# Patient Record
Sex: FEMALE | Race: WHITE | URBAN - METROPOLITAN AREA
[De-identification: names, ages, dates, MRNs, and addresses within clinical notes are randomized per-mention and may not be internally consistent; named-entity substitution may affect disease eponyms.]

---

## 2017-07-11 PROBLEM — 134407002 CHRONIC BACK PAIN: Status: ACTIVE | Noted: 2017-07-11

## 2021-03-16 ENCOUNTER — OFFICE VISIT (OUTPATIENT)
Dept: URBAN - METROPOLITAN AREA CLINIC 44 | Facility: CLINIC | Age: 51
End: 2021-03-16

## 2021-08-28 ENCOUNTER — TELEPHONE ENCOUNTER (OUTPATIENT)
Dept: URBAN - METROPOLITAN AREA CLINIC 13 | Facility: CLINIC | Age: 51
End: 2021-08-28

## 2021-08-28 RX ORDER — HYDROCORTISONE CREAM 1% 10 MG/G
CREAM TOPICAL
OUTPATIENT
Start: 2017-11-28 | End: 2021-03-16

## 2021-08-28 RX ORDER — LUBIPROSTONE 8 UG/1
CAPSULE, GELATIN COATED ORAL
OUTPATIENT
End: 2017-08-28

## 2021-08-28 RX ORDER — LINACLOTIDE 145 UG/1
CAPSULE, GELATIN COATED ORAL
OUTPATIENT
Start: 2017-11-09 | End: 2017-11-28

## 2021-08-28 RX ORDER — FLUCONAZOLE 100 MG/1
TABLET ORAL
OUTPATIENT
Start: 2017-07-11 | End: 2017-08-28

## 2021-08-28 RX ORDER — NITAZOXANIDE 500 MG/1
TABLET ORAL
OUTPATIENT
Start: 2017-08-28 | End: 2017-11-28

## 2021-08-28 RX ORDER — BIFIDOBACTERIUM LONGUM 10MM CELL
CAPSULE ORAL
OUTPATIENT
Start: 2017-08-28 | End: 2021-03-16

## 2021-08-28 RX ORDER — LINACLOTIDE 145 UG/1
CAPSULE, GELATIN COATED ORAL
OUTPATIENT
End: 2017-08-28

## 2021-08-28 RX ORDER — METRONIDAZOLE 500 MG/1
TABLET ORAL
OUTPATIENT
Start: 2017-08-29 | End: 2017-11-28

## 2021-08-28 RX ORDER — NITAZOXANIDE 500 MG/1
TABLET ORAL
OUTPATIENT
Start: 2017-11-28 | End: 2021-03-16

## 2021-08-29 ENCOUNTER — TELEPHONE ENCOUNTER (OUTPATIENT)
Dept: URBAN - METROPOLITAN AREA CLINIC 13 | Facility: CLINIC | Age: 51
End: 2021-08-29

## 2021-08-29 RX ORDER — SENNA 8.6 MG/1
TABLET, COATED ORAL
Status: ACTIVE | COMMUNITY
Start: 2021-03-16

## 2021-10-12 ENCOUNTER — DASHBOARD ENCOUNTERS (OUTPATIENT)
Age: 51
End: 2021-10-12

## 2021-10-12 ENCOUNTER — OFFICE VISIT (OUTPATIENT)
Dept: URBAN - METROPOLITAN AREA CLINIC 46 | Facility: CLINIC | Age: 51
End: 2021-10-12
Payer: COMMERCIAL

## 2021-10-12 VITALS
SYSTOLIC BLOOD PRESSURE: 140 MMHG | HEART RATE: 62 BPM | BODY MASS INDEX: 22.07 KG/M2 | HEIGHT: 67 IN | OXYGEN SATURATION: 100 % | TEMPERATURE: 98.3 F | DIASTOLIC BLOOD PRESSURE: 85 MMHG | WEIGHT: 140.6 LBS

## 2021-10-12 DIAGNOSIS — R14.0 BLOATING: ICD-10-CM

## 2021-10-12 DIAGNOSIS — K59.01 SLOW TRANSIT CONSTIPATION: ICD-10-CM

## 2021-10-12 PROBLEM — 35298007: Status: ACTIVE | Noted: 2021-10-12

## 2021-10-12 PROCEDURE — 99214 OFFICE O/P EST MOD 30 MIN: CPT | Performed by: INTERNAL MEDICINE

## 2021-10-12 RX ORDER — POLYETHYLENE GLYCOL 3350, SODIUM SULFATE, SODIUM CHLORIDE, POTASSIUM CHLORIDE, ASCORBIC ACID, SODIUM ASCORBATE 140-9-5.2G
AS DIRECTED KIT ORAL
Qty: 1 | Refills: 0 | OUTPATIENT
Start: 2021-10-12 | End: 2021-10-13

## 2021-10-12 RX ORDER — ACETAMINOPHEN 500 MG
AS DIRECTED TABLET ORAL
Status: ACTIVE | COMMUNITY

## 2021-10-12 RX ORDER — PHENTERMINE HYDROCHLORIDE 37.5 MG/1
1 TABLET TABLET ORAL ONCE A DAY
Status: ACTIVE | COMMUNITY

## 2021-10-12 RX ORDER — PHENTERMINE HYDROCHLORIDE 37.5 MG/1
1 TABLET TABLET ORAL ONCE A DAY
OUTPATIENT

## 2021-10-12 RX ORDER — SENNA 8.6 MG/1
TABLET, COATED ORAL
Status: DISCONTINUED | COMMUNITY
Start: 2021-03-16

## 2021-10-12 RX ORDER — PRUCALOPRIDE 2 MG/1
1 TABLET TABLET, FILM COATED ORAL ONCE A DAY
Qty: 30 | Refills: 3 | OUTPATIENT
Start: 2021-10-12 | End: 2022-02-08

## 2021-10-12 RX ORDER — SODIUM, POTASSIUM,MAG SULFATES 17.5-3.13G
177ML SOLUTION, RECONSTITUTED, ORAL ORAL AS DIRECTED
Qty: 1 KIT | Refills: 0 | OUTPATIENT

## 2021-10-12 NOTE — PHYSICAL EXAM CONSTITUTIONAL:
well developed, well nourished , in no acute distress , ambulating without difficulty , normal communication ability, Crying during visit. No SI.

## 2021-10-12 NOTE — HPI-TODAY'S VISIT:
Pt with a hx of IBS-C and severe bloating. Workup in 2017 with a EGD and colonsocopy without other pathology and treated in the past with Amitiza, Linzess which helped but not covered and  Trulance that did not work. Re-presented 3/2021 with recurrent bloating and borborygmi with constipation with BM q3 days after an enemas. CT with cervical fullness and constipation. Daily Senna and Miralax advised. States has seen GYN and not felt to have a malingnacy. States still with daily bloating and feeling of incomplete evacuation. To the point where frustrated and crying during visit. Trial of Linzess samples again and did not like and Motegrity samples and did not think helped but did not take long term. Saw PCP and started on Phentermine. Currently taking nothing and BM QD to QOD with enemas and still wtih incomplete relief. No weight gain or loss. No bleeding.

## 2021-11-08 ENCOUNTER — OFFICE VISIT (OUTPATIENT)
Dept: URBAN - METROPOLITAN AREA SURGERY CENTER 28 | Facility: SURGERY CENTER | Age: 51
End: 2021-11-08
Payer: COMMERCIAL

## 2021-11-08 DIAGNOSIS — R10.2 ABDOMINAL PAIN, SUPRAPUBIC: ICD-10-CM

## 2021-11-08 PROCEDURE — G8907 PT DOC NO EVENTS ON DISCHARG: HCPCS | Performed by: INTERNAL MEDICINE

## 2021-11-08 PROCEDURE — 45378 DIAGNOSTIC COLONOSCOPY: CPT | Performed by: INTERNAL MEDICINE

## 2021-11-08 RX ORDER — SODIUM, POTASSIUM,MAG SULFATES 17.5-3.13G
177ML SOLUTION, RECONSTITUTED, ORAL ORAL AS DIRECTED
Qty: 1 KIT | Refills: 0 | Status: ACTIVE | COMMUNITY

## 2021-11-08 RX ORDER — ACETAMINOPHEN 500 MG
AS DIRECTED TABLET ORAL
Status: ACTIVE | COMMUNITY

## 2021-11-08 RX ORDER — PRUCALOPRIDE 2 MG/1
1 TABLET TABLET, FILM COATED ORAL ONCE A DAY
Qty: 30 | Refills: 3 | Status: ACTIVE | COMMUNITY
Start: 2021-10-12 | End: 2022-02-08

## 2021-12-03 ENCOUNTER — OFFICE VISIT (OUTPATIENT)
Dept: URBAN - METROPOLITAN AREA SURGERY CENTER 28 | Facility: SURGERY CENTER | Age: 51
End: 2021-12-03

## 2024-07-08 ENCOUNTER — OFFICE VISIT (OUTPATIENT)
Dept: URBAN - METROPOLITAN AREA CLINIC 82 | Facility: CLINIC | Age: 54
End: 2024-07-08

## 2024-07-08 ENCOUNTER — WEB ENCOUNTER (OUTPATIENT)
Dept: URBAN - METROPOLITAN AREA CLINIC 25 | Facility: CLINIC | Age: 54
End: 2024-07-08

## 2024-07-08 ENCOUNTER — OFFICE VISIT (OUTPATIENT)
Dept: URBAN - METROPOLITAN AREA CLINIC 25 | Facility: CLINIC | Age: 54
End: 2024-07-08

## 2024-07-08 ENCOUNTER — OFFICE VISIT (OUTPATIENT)
Dept: URBAN - METROPOLITAN AREA CLINIC 25 | Facility: CLINIC | Age: 54
End: 2024-07-08
Payer: COMMERCIAL

## 2024-07-08 VITALS
HEART RATE: 50 BPM | SYSTOLIC BLOOD PRESSURE: 118 MMHG | TEMPERATURE: 97.9 F | DIASTOLIC BLOOD PRESSURE: 79 MMHG | BODY MASS INDEX: 23.85 KG/M2 | HEIGHT: 66 IN | WEIGHT: 148.4 LBS

## 2024-07-08 DIAGNOSIS — K58.1 IRRITABLE BOWEL SYNDROME WITH CONSTIPATION: ICD-10-CM

## 2024-07-08 DIAGNOSIS — R14.0 ABDOMINAL BLOATING: ICD-10-CM

## 2024-07-08 PROBLEM — 440630006: Status: ACTIVE | Noted: 2024-07-08

## 2024-07-08 PROCEDURE — 99204 OFFICE O/P NEW MOD 45 MIN: CPT | Performed by: INTERNAL MEDICINE

## 2024-07-08 RX ORDER — SODIUM, POTASSIUM,MAG SULFATES 17.5-3.13G
177ML SOLUTION, RECONSTITUTED, ORAL ORAL AS DIRECTED
Qty: 1 KIT | Refills: 0 | Status: ON HOLD | COMMUNITY

## 2024-07-08 RX ORDER — ACETAMINOPHEN 500 MG
AS DIRECTED TABLET ORAL
Status: ON HOLD | COMMUNITY

## 2024-07-08 NOTE — HPI-TODAY'S VISIT:
July 2024 visit: Previous visit with [facility].  Abdominal ultrasound vaginal in 2021 showed irregular mucosal infolding and fluid in the cervix possibly physiologic. CT scan confirmed constipation and fullness in the uterine cervix. Colonoscopy in November 2021 was normal up to the TI.  Procedure was performed by Justen Mares. EGD in 2017 revealed esophageal Candida.  Pathology revealed no celiac disease or H. pylori.  No known history of colon cancer / GI malignancies / IBD in first degree family members.  Struggles with chronic constipation and bloating. symptoms ongoing for 5 yrs atleast. Tried Linzess before causes diarrhea. amitiza - inadequate relief. she is using super cleanse from [employer].

## 2024-07-09 ENCOUNTER — TELEPHONE ENCOUNTER (OUTPATIENT)
Dept: URBAN - METROPOLITAN AREA CLINIC 25 | Facility: CLINIC | Age: 54
End: 2024-07-09

## 2024-07-09 ENCOUNTER — WEB ENCOUNTER (OUTPATIENT)
Dept: URBAN - METROPOLITAN AREA CLINIC 25 | Facility: CLINIC | Age: 54
End: 2024-07-09

## 2024-09-09 ENCOUNTER — OFFICE VISIT (OUTPATIENT)
Dept: URBAN - METROPOLITAN AREA CLINIC 84 | Facility: CLINIC | Age: 54
End: 2024-09-09